# Patient Record
Sex: FEMALE | Race: BLACK OR AFRICAN AMERICAN
[De-identification: names, ages, dates, MRNs, and addresses within clinical notes are randomized per-mention and may not be internally consistent; named-entity substitution may affect disease eponyms.]

---

## 2017-07-31 ENCOUNTER — HOSPITAL ENCOUNTER (EMERGENCY)
Dept: HOSPITAL 72 - EMR | Age: 42
Discharge: HOME | End: 2017-07-31
Payer: SELF-PAY

## 2017-07-31 VITALS — SYSTOLIC BLOOD PRESSURE: 175 MMHG | DIASTOLIC BLOOD PRESSURE: 90 MMHG

## 2017-07-31 VITALS — SYSTOLIC BLOOD PRESSURE: 178 MMHG | DIASTOLIC BLOOD PRESSURE: 84 MMHG

## 2017-07-31 VITALS — BODY MASS INDEX: 47.09 KG/M2 | HEIGHT: 66 IN | WEIGHT: 293 LBS

## 2017-07-31 VITALS — DIASTOLIC BLOOD PRESSURE: 102 MMHG | SYSTOLIC BLOOD PRESSURE: 158 MMHG

## 2017-07-31 VITALS — DIASTOLIC BLOOD PRESSURE: 94 MMHG | SYSTOLIC BLOOD PRESSURE: 179 MMHG

## 2017-07-31 VITALS — SYSTOLIC BLOOD PRESSURE: 151 MMHG | DIASTOLIC BLOOD PRESSURE: 92 MMHG

## 2017-07-31 VITALS — SYSTOLIC BLOOD PRESSURE: 158 MMHG | DIASTOLIC BLOOD PRESSURE: 102 MMHG

## 2017-07-31 DIAGNOSIS — R51: Primary | ICD-10-CM

## 2017-07-31 DIAGNOSIS — Z91.041: ICD-10-CM

## 2017-07-31 DIAGNOSIS — I89.0: ICD-10-CM

## 2017-07-31 DIAGNOSIS — Z88.6: ICD-10-CM

## 2017-07-31 DIAGNOSIS — I10: ICD-10-CM

## 2017-07-31 LAB
ALBUMIN/GLOB SERPL: 0.8 {RATIO} (ref 1–2.7)
ALT SERPL-CCNC: 13 U/L (ref 3–33)
ANION GAP SERPL CALC-SCNC: 9 MMOL/L (ref 5–15)
APPEARANCE UR: (no result)
APTT BLD: 32 SEC (ref 23–33)
AST SERPL-CCNC: 14 U/L (ref 5–40)
BACTERIA #/AREA URNS HPF: (no result) /HPF
BASOPHILS NFR BLD AUTO: 0.7 % (ref 0–2)
CALCIUM SERPL-MCNC: 8.6 MG/DL (ref 8.6–10.2)
CHLORIDE SERPL-SCNC: 99 MEQ/L (ref 98–107)
CK MB SERPL-MCNC: 2.5 NG/ML (ref ?–3.8)
CO2 SERPL-SCNC: 31 MEQ/L (ref 20–30)
CREAT SERPL-MCNC: 0.9 MG/DL (ref 0.5–0.9)
EOSINOPHIL NFR BLD AUTO: 3.1 % (ref 0–3)
ERYTHROCYTE [DISTWIDTH] IN BLOOD BY AUTOMATED COUNT: 16.2 % (ref 11.6–14.8)
GFR SERPLBLD BASED ON 1.73 SQ M-ARVRAT: > 60 ML/MIN (ref 60–?)
GLOBULIN SER-MCNC: 4 G/DL
HEMOLYSIS: 1
INR PPP: 1 (ref 0.9–1.1)
KETONES UR QL STRIP: NEGATIVE
LEUKOCYTE ESTERASE UR QL STRIP: (no result)
LYMPHOCYTES NFR BLD AUTO: 37.9 % (ref 20–45)
MCH RBC QN AUTO: 24.6 PG (ref 27–31)
MCHC RBC AUTO-ENTMCNC: 30.3 G/DL (ref 32–36)
MCV RBC AUTO: 81 FL (ref 80–99)
MONOCYTES NFR BLD AUTO: 5.1 % (ref 1–10)
NEUTROPHILS NFR BLD AUTO: 53.2 % (ref 45–75)
NITRITE UR QL STRIP: NEGATIVE
PH UR STRIP: 6 [PH] (ref 4.5–8)
PLATELET # BLD: 266 K/UL (ref 150–450)
PMV BLD AUTO: 6.8 FL (ref 6.5–10.1)
POTASSIUM SERPL-SCNC: 4.2 MEQ/L (ref 3.4–4.9)
PROT SERPL-MCNC: 7.4 G/DL (ref 6.6–8.7)
PROT UR QL STRIP: NEGATIVE
PROTHROMBIN TIME: 10.9 SEC (ref 9.3–11.5)
RBC # BLD AUTO: 4.82 M/UL (ref 4.2–5.4)
RBC #/AREA URNS HPF: (no result) /HPF (ref 0–2)
SODIUM SERPL-SCNC: 139 MEQ/L (ref 135–145)
SP GR UR STRIP: 1.02 (ref 1–1.03)
SQUAMOUS #/AREA URNS LPF: (no result) /LPF
TRICHOMONAS #/AREA URNS HPF: (no result) /HPF
TROPONIN I SERPL-MCNC: < 0.3 NG/ML (ref ?–0.3)
UROBILINOGEN UR-MCNC: NORMAL MG/DL (ref 0–1)
WBC # BLD AUTO: 8.7 K/UL (ref 4.8–10.8)
WBC #/AREA URNS HPF: (no result) /HPF (ref 0–2)

## 2017-07-31 PROCEDURE — 84484 ASSAY OF TROPONIN QUANT: CPT

## 2017-07-31 PROCEDURE — 85610 PROTHROMBIN TIME: CPT

## 2017-07-31 PROCEDURE — 80053 COMPREHEN METABOLIC PANEL: CPT

## 2017-07-31 PROCEDURE — 80300: CPT

## 2017-07-31 PROCEDURE — 99284 EMERGENCY DEPT VISIT MOD MDM: CPT

## 2017-07-31 PROCEDURE — 93005 ELECTROCARDIOGRAM TRACING: CPT

## 2017-07-31 PROCEDURE — 81003 URINALYSIS AUTO W/O SCOPE: CPT

## 2017-07-31 PROCEDURE — 85025 COMPLETE CBC W/AUTO DIFF WBC: CPT

## 2017-07-31 PROCEDURE — 83880 ASSAY OF NATRIURETIC PEPTIDE: CPT

## 2017-07-31 PROCEDURE — 85730 THROMBOPLASTIN TIME PARTIAL: CPT

## 2017-07-31 PROCEDURE — 82550 ASSAY OF CK (CPK): CPT

## 2017-07-31 PROCEDURE — 96374 THER/PROPH/DIAG INJ IV PUSH: CPT

## 2017-07-31 PROCEDURE — 82553 CREATINE MB FRACTION: CPT

## 2017-07-31 PROCEDURE — 36415 COLL VENOUS BLD VENIPUNCTURE: CPT

## 2017-07-31 PROCEDURE — 71010: CPT

## 2017-07-31 NOTE — DIAGNOSTIC IMAGING REPORT
Indication: SOB



Technique: One view of the chest



Comparison: 5/17/2013



Findings: Lungs and pleural spaces are clear. Heart size is upper limits normal.

Better inspiration on the current study



Impression: No acute process

## 2017-07-31 NOTE — EMERGENCY ROOM REPORT
History of Present Illness


General


Chief Complaint:  Headache


Source:  Patient





Present Illness


HPI


This patient states she has had an ongoing headache for the past day.  She also 

states that she has had lower extremity edema in both of her lower extremities.

  She states this has been ongoing and waxes and wanes in severity.  She at one 

point was on a blood pressure medication and this patient was taken off of this 

medication because her blood pressure was normal.  Otherwise, she has no 

medical problems and is on no medications.  She denies blurry vision or double 

vision.  She denies chest pain.  She states she does occasionally have 

shortness of breath.  She denies fever or chills.  She denies vomiting.  She 

has had some nausea.  She has no other complaints.


Allergies:  


Coded Allergies:  


     IBUPROFEN (Verified  Allergy, Severe, facial edema, 5/16/13)


Uncoded Allergies:  


     contrast dye (Allergy, Severe, syncope, 5/16/13)





Patient History


Past Medical History:  see triage record, HTN


Social History:  Denies: alcohol use, drug use, smoking


Last Menstrual Period:  a week ago


Reviewed Nursing Documentation:  PMH: Agreed, PSxH: Agreed





Nursing Documentation-PMH


Past Medical History:  No History, Except For


Hx Hypertension:  Yes





Review of Systems


All Other Systems:  negative except mentioned in HPI





Physical Exam





Vital Signs








  Date Time  Temp Pulse Resp B/P Pulse Ox O2 Delivery O2 Flow Rate FiO2


 


7/31/17 11:00 98.1 73 16 164/98 94 Room Air  








Sp02 EP Interpretation:  reviewed, normal


General Appearance:  no apparent distress, alert, GCS 15, non-toxic


Head:  normocephalic, atraumatic


Eyes:  bilateral eye PERRL, bilateral eye normal inspection


ENT:  hearing grossly normal, normal pharynx, no angioedema, normal voice


Neck:  full range of motion, supple/symm/no masses


Respiratory:  chest non-tender, lungs clear, normal breath sounds, speaking 

full sentences


Cardiovascular #1:  regular rate, rhythm, no edema


Gastrointestinal:  normal bowel sounds, non tender, soft, non-distended, no 

guarding, no rebound


Rectal:  deferred


Musculoskeletal:  back normal, gait/station normal, normal range of motion, non-

tender, swelling - 2+pitting BLE edema


Neurologic:  alert, oriented x3, responsive, motor strength/tone normal, 

sensory intact, speech normal


Psychiatric:  judgement/insight normal, memory normal, mood/affect normal, no 

suicidal/homicidal ideation


Skin:  normal color, no rash, warm/dry, well hydrated





Medical Decision Making


Diagnostic Impression:  


 Primary Impression:  


 Headache


 Additional Impressions:  


 Hypertension


 Lymphedema


ER Course


This patient presented with hypertension.  The patient is very difficult to 

obtain of a pressure on because of her morbid obesity.  There was no extra 

large blood pressure cuff available.  Therefore, blood pressure was taken on 

the patient's forearm.  Pulses not accurate method of measuring blood pressure.

  Regardless, patient also complained of ongoing headache.  This by history 

sounded to be like a tension headache.  There are no history or physical exam 

findings that would make me concerned for an intracranial process.  Therefore, 

I did not obtain a CT of the head.  Patient also has lymphedema the bilateral 

lower extremities.  This appears to be peripheral edema.  There is no evidence 

of congestive heart failure with a normal chest x-ray and BNP.  The patient's 

laboratory workup to include CBC, CMP and cardiac enzymes are unremarkable.  

Overall, this patient a very benign evaluation.  I have low suspicion for acute 

coronary syndrome or PE at this time.  I will start the patient on amlodipine 

and hydrochlorothiazide for blood pressure.  I will also give the patient Lasix 

for breakthrough lymphedema.  Patient was educated on electrolyte depletion and 

increasing her potassium intake.  Patient indicated understanding.  Patient was 

also instructed to followup closely with her primary care physician.  The 

patient's given return precautions and followup instructions.





Labs








Test


  7/31/17


11:05 7/31/17


11:30


 


White Blood Count


  8.7 K/UL


(4.8-10.8) 


 


 


Red Blood Count


  4.82 M/UL


(4.20-5.40) 


 


 


Hemoglobin


  11.8 G/DL


(12.0-16.0) 


 


 


Hematocrit


  39.0 %


(37.0-47.0) 


 


 


Mean Corpuscular Volume 81 FL (80-99)  


 


Mean Corpuscular Hemoglobin


  24.6 PG


(27.0-31.0) 


 


 


Mean Corpuscular Hemoglobin


Concent 30.3 G/DL


(32.0-36.0) 


 


 


Red Cell Distribution Width


  16.2 %


(11.6-14.8) 


 


 


Platelet Count


  266 K/UL


(150-450) 


 


 


Mean Platelet Volume


  6.8 FL


(6.5-10.1) 


 


 


Neutrophils (%) (Auto)


  53.2 %


(45.0-75.0) 


 


 


Lymphocytes (%) (Auto)


  37.9 %


(20.0-45.0) 


 


 


Monocytes (%) (Auto)


  5.1 %


(1.0-10.0) 


 


 


Eosinophils (%) (Auto)


  3.1 %


(0.0-3.0) 


 


 


Basophils (%) (Auto)


  0.7 %


(0.0-2.0) 


 


 


Prothrombin Time


  


  10.9 SEC


(9.30-11.50)


 


Prothromb Time International


Ratio 


  1.0 (0.9-1.1) 


 


 


Activated Partial


Thromboplast Time 


  32 SEC (23-33) 


 


 


Urine Color  Yellow 


 


Urine Appearance


  


  Slightly


cloudy


 


Urine pH  6 (4.5-8.0) 


 


Urine Specific Gravity


  


  1.020


(1.005-1.035)


 


Urine Protein


  


  Negative


(NEGATIVE)


 


Urine Glucose (UA)


  


  Negative


(NEGATIVE)


 


Urine Ketones


  


  Negative


(NEGATIVE)


 


Urine Occult Blood


  


  Negative


(NEGATIVE)


 


Urine Nitrite


  


  Negative


(NEGATIVE)


 


Urine Bilirubin


  


  Negative


(NEGATIVE)


 


Urine Urobilinogen


  


  Normal MG/DL


(0.0-1.0)


 


Urine Leukocyte Esterase  3+ (NEGATIVE) 


 


Urine RBC


  


  0-2 /HPF (0 -


2)


 


Urine WBC


  


  5-10 /HPF (0 -


2)


 


Urine Squamous Epithelial


Cells 


  Moderate /LPF


(NONE/OCC)


 


Urine Bacteria


  


  Few /HPF


(NONE)


 


Urine Trichomonas


  


  Few /HPF


(NONE)


 


Sodium Level


  


  139 mEQ/L


(135-145)


 


Potassium Level


  


  4.2 mEQ/L


(3.4-4.9)


 


Chloride Level


  


  99 mEQ/L


()


 


Carbon Dioxide Level


  


  31 mEQ/L


(20-30)


 


Anion Gap  9 (5-15) 


 


Blood Urea Nitrogen  9 mg/dL (7-23) 


 


Creatinine


  


  0.9 mg/dL


(0.5-0.9)


 


Estimat Glomerular Filtration


Rate 


  > 60 mL/min


(>60)


 


Glucose Level


  


  107 mg/dL


()


 


Calcium Level


  


  8.6 mg/dL


(8.6-10.2)


 


Total Bilirubin


  


  0.7 mg/dL


(0.0-1.2)


 


Aspartate Amino Transf


(AST/SGOT) 


  14 U/L (5-40) 


 


 


Alanine Aminotransferase


(ALT/SGPT) 


  13 U/L (3-33) 


 


 


Alkaline Phosphatase


  


  99 U/L


()


 


Total Creatine Kinase


  


  195 U/L


()


 


Creatine Kinase MB


  


  2.5 ng/mL (<


3.8)


 


Creatine Kinase MB Relative


Index 


  1.2 


 


 


Troponin I


  


  < 0.30 ng/mL


(<=0.30)


 


Pro-B-Type Natriuretic Peptide


  


  72 pg/mL


(0-125)


 


Total Protein


  


  7.4 g/dL


(6.6-8.7)


 


Albumin


  


  3.4 g/dL


(3.5-5.2)


 


Globulin  4.0 g/dL 


 


Albumin/Globulin Ratio  0.8 (1.0-2.7) 


 


Urine Opiates Screen


  


  Negative


(NEGATIVE)


 


Urine Barbiturates Screen


  


  Negative


(NEGATIVE)


 


Phencyclidine (PCP) Screen


  


  Negative


(NEGATIVE)


 


Urine Amphetamines Screen


  


  Negative


(NEGATIVE)


 


Urine Benzodiazepines Screen


  


  Negative


(NEGATIVE)


 


Urine Cocaine Screen


  


  Negative


(NEGATIVE)


 


Urine Marijuana (THC) Screen


  


  Negative


(NEGATIVE)








EKG Diagnostic Results


Rate:  normal


Rhythm:  NSR


ST Segments:  no acute changes





Rhythm Strip Diag. Results


EP Interpretation:  yes


Rate:  70's


Rhythm:  NSR, no PVC's, no ectopy





Chest X-Ray Diagnostic Results


Chest X-Ray Diagnostic Results :  


   Chest X-Ray Ordered:  Yes


   # of Views/Limited/Complete:  1 View


   Indication:  Shortness of Breath


   EP Interpretation:  No


   Interpretation:  no consolidation, no effusion, no pneumothorax, no acute 

cardiopulmonary disease


   Impression:  No acute disease





Last Vital Signs








  Date Time  Temp Pulse Resp B/P Pulse Ox O2 Delivery O2 Flow Rate FiO2


 


7/31/17 13:00  75 18 179/94 97 Room Air  


 


7/31/17 11:00 98.1       








Status:  improved


Disposition:  HOME, SELF-CARE


Condition:  Improved


Referrals:  


NON PHYSICIAN (PCP)


Patient Instructions:  Tension Headache











CHRISTINA SHERMAN D.O. Jul 31, 2017 13:30

## 2017-08-02 NOTE — CARDIOLOGY REPORT
--------------- APPROVED REPORT --------------





EKG Measurement

Heart [id number]

NM [id number]

[id number]

[id number]

[id number]





Normal sinus rhythm

Normal ECG

## 2018-06-22 ENCOUNTER — HOSPITAL ENCOUNTER (EMERGENCY)
Dept: HOSPITAL 72 - EMR | Age: 43
Discharge: HOME | End: 2018-06-22
Payer: MEDICAID

## 2018-06-22 VITALS — BODY MASS INDEX: 45.99 KG/M2 | WEIGHT: 293 LBS | HEIGHT: 67 IN

## 2018-06-22 VITALS — DIASTOLIC BLOOD PRESSURE: 94 MMHG | SYSTOLIC BLOOD PRESSURE: 136 MMHG

## 2018-06-22 VITALS — DIASTOLIC BLOOD PRESSURE: 82 MMHG | SYSTOLIC BLOOD PRESSURE: 136 MMHG

## 2018-06-22 DIAGNOSIS — R42: Primary | ICD-10-CM

## 2018-06-22 DIAGNOSIS — Z88.6: ICD-10-CM

## 2018-06-22 DIAGNOSIS — R06.02: ICD-10-CM

## 2018-06-22 DIAGNOSIS — I10: ICD-10-CM

## 2018-06-22 LAB
ADD MANUAL DIFF: NO
ALBUMIN SERPL-MCNC: 3 G/DL (ref 3.4–5)
ALBUMIN/GLOB SERPL: 0.6 {RATIO} (ref 1–2.7)
ALP SERPL-CCNC: 103 U/L (ref 46–116)
ALT SERPL-CCNC: 22 U/L (ref 12–78)
ANION GAP SERPL CALC-SCNC: 4 MMOL/L (ref 5–15)
AST SERPL-CCNC: 16 U/L (ref 15–37)
BASOPHILS NFR BLD AUTO: 0.6 % (ref 0–2)
BILIRUB SERPL-MCNC: 0.7 MG/DL (ref 0.2–1)
BUN SERPL-MCNC: 12 MG/DL (ref 7–18)
CALCIUM SERPL-MCNC: 8.7 MG/DL (ref 8.5–10.1)
CHLORIDE SERPL-SCNC: 101 MMOL/L (ref 98–107)
CO2 SERPL-SCNC: 31 MMOL/L (ref 21–32)
CREAT SERPL-MCNC: 1.1 MG/DL (ref 0.55–1.3)
EOSINOPHIL NFR BLD AUTO: 2.4 % (ref 0–3)
ERYTHROCYTE [DISTWIDTH] IN BLOOD BY AUTOMATED COUNT: 15.6 % (ref 11.6–14.8)
GLOBULIN SER-MCNC: 5 G/DL
HCT VFR BLD CALC: 39.1 % (ref 37–47)
HGB BLD-MCNC: 12.2 G/DL (ref 12–16)
LYMPHOCYTES NFR BLD AUTO: 34.7 % (ref 20–45)
MCV RBC AUTO: 81 FL (ref 80–99)
MONOCYTES NFR BLD AUTO: 4.7 % (ref 1–10)
NEUTROPHILS NFR BLD AUTO: 57.6 % (ref 45–75)
PLATELET # BLD: 282 K/UL (ref 150–450)
POTASSIUM SERPL-SCNC: 3.9 MMOL/L (ref 3.5–5.1)
RBC # BLD AUTO: 4.86 M/UL (ref 4.2–5.4)
SODIUM SERPL-SCNC: 136 MMOL/L (ref 136–145)
WBC # BLD AUTO: 10.9 K/UL (ref 4.8–10.8)

## 2018-06-22 PROCEDURE — 71045 X-RAY EXAM CHEST 1 VIEW: CPT

## 2018-06-22 PROCEDURE — 83880 ASSAY OF NATRIURETIC PEPTIDE: CPT

## 2018-06-22 PROCEDURE — 85379 FIBRIN DEGRADATION QUANT: CPT

## 2018-06-22 PROCEDURE — 36415 COLL VENOUS BLD VENIPUNCTURE: CPT

## 2018-06-22 PROCEDURE — 84484 ASSAY OF TROPONIN QUANT: CPT

## 2018-06-22 PROCEDURE — 99283 EMERGENCY DEPT VISIT LOW MDM: CPT

## 2018-06-22 PROCEDURE — 93005 ELECTROCARDIOGRAM TRACING: CPT

## 2018-06-22 PROCEDURE — 85025 COMPLETE CBC W/AUTO DIFF WBC: CPT

## 2018-06-22 PROCEDURE — 80053 COMPREHEN METABOLIC PANEL: CPT

## 2018-06-22 NOTE — DIAGNOSTIC IMAGING REPORT
Indication: Shortness of breath

 

Technique: One view of the chest

 

Comparison: 7/31/2017

 

Findings: Lungs and pleural spaces are clear. Heart size is upper limits normal.  No

significant change

 

Impression: No acute process

## 2018-06-22 NOTE — CARDIOLOGY REPORT
--------------- APPROVED REPORT --------------





EKG Measurement

Heart [id number]

AK [id number]

[id number]

[id number]

[id number]





Normal sinus rhythm

Possible Left atrial enlargement

Borderline ECG

## 2018-06-22 NOTE — EMERGENCY ROOM REPORT
History of Present Illness


General


Chief Complaint:  Dizziness


Source:  Patient





Present Illness


HPI


41yo F p/w 1 day history of intermittent SOB as well as dizziness and LH, 

recently stopped taking BP meds, denies numbness, tingling, weakness, slurred 

speech, chest pain, syncope.


Allergies:  


Coded Allergies:  


     IBUPROFEN (Verified  Allergy, Severe, facial edema, 6/22/18)


Uncoded Allergies:  


     contrast dye (Allergy, Severe, syncope, 5/16/13)





Patient History


Past Medical History:  see triage record


Reviewed Nursing Documentation:  PMH: Agreed; PSxH: Agreed





Nursing Documentation-PMH


Hx Hypertension:  Yes





Review of Systems


All Other Systems:  negative except mentioned in HPI





Physical Exam


Sp02 EP Interpretation:  reviewed, normal


General Appearance:  no apparent distress, alert, non-toxic


Head:  normocephalic


Eyes:  bilateral eye normal inspection, bilateral eye PERRL, bilateral eye EOMI


ENT:  normal ENT inspection, hearing grossly normal, normal pharynx, no 

angioedema, normal voice, moist mucus membranes


Neck:  normal inspection, full range of motion, supple, supple/symm/no masses


Respiratory:  chest non-tender, lungs clear, normal breath sounds, chest 

symmetrical, palpation of chest normal


Cardiovascular #1:  normal peripheral pulses, regular rate, rhythm


Cardiovascular #2:  2+ radial (R), 2+ radial (L), 2+ dorsalis pedis (R), 2+ 

dorsalis pedis (L)


Gastrointestinal:  normal inspection, non tender, soft, no mass, no guarding, 

no rebound


Rectal:  deferred


Genitourinary:  normal inspection, no CVA tenderness


Musculoskeletal:  back normal, gait/station normal, normal range of motion, non-

tender, no calf tenderness, Daniela's Sign negative


Neurologic:  normal inspection, alert, oriented x3, responsive, CNs III-XII nml 

as tested, motor strength/tone normal, sensory intact, cerebellar normal, 

normal gait, speech normal


Psychiatric:  judgement/insight normal, memory normal, mood/affect normal, no 

suicidal/homicidal ideation


Skin:  normal color, no rash, warm/dry, normal turgor


Lymphatic:  no adenopathy





Medical Decision Making


Diagnostic Impression:  


 Primary Impression:  


 Dizziness of unknown cause


 Additional Impression:  


 Shortness of breath


ER Course


Patient with shortness of breath and lightheadedness, no vertigo, normal neuro 

exam, normal exam otherwise as well other than marked obesity


EKG, labs including d-dimer and proBNP also normal


Chest x-ray with no acute abnormalities


Patient with no chest pain no active shortness of breath, and normal blood 

pressure other than slightly high diastolic,


Patient will ambulate without difficulty


She was given aspirin, but I do not suspect atypical acute coronary syndrome


She'll be discharge, diagnosis shortness of breath with no obvious etiology, 

and lightheadedness without focal findings


I do suspect there is some component of anxiety as well as sleep apnea, and 

recommend she follow up with her primary care doctor


EKG Diagnostic Results


EKG Time:  01:54


EP Interpretation:  no st-t changes, no twi's


Rate:  normal


Rhythm:  NSR


ST Segments:  no acute changes


ASA given to the pt in ED:  Yes





Rhythm Strip Diag. Results


Rhythm Strip Time:  02:04


EP Interpretation:  yes


Rate:  89


Rhythm:  NSR, no PVC's, no ectopy





Chest X-Ray Diagnostic Results


Chest X-Ray Diagnostic Results :  


   Chest X-Ray Ordered:  Yes


   # of Views/Limited/Complete:  1 View


   Indication:  Shortness of Breath


   EP Interpretation:  Yes


   Interpretation:  no consolidation, no effusion, no pneumothorax, no acute 

cardiopulmonary disease


   Impression:  No acute disease


   Electronically Signed by:  Namita Blanco MD


Disposition:  HOME, SELF-CARE


Condition:  Stable











NAMITA BLANCO M.D Jun 22, 2018 02:10

## 2018-11-26 ENCOUNTER — HOSPITAL ENCOUNTER (EMERGENCY)
Dept: HOSPITAL 72 - EMR | Age: 43
Discharge: HOME | End: 2018-11-26
Payer: SELF-PAY

## 2018-11-26 VITALS — HEIGHT: 66 IN | BODY MASS INDEX: 47.09 KG/M2 | WEIGHT: 293 LBS

## 2018-11-26 VITALS — DIASTOLIC BLOOD PRESSURE: 91 MMHG | SYSTOLIC BLOOD PRESSURE: 155 MMHG

## 2018-11-26 VITALS — DIASTOLIC BLOOD PRESSURE: 92 MMHG | SYSTOLIC BLOOD PRESSURE: 151 MMHG

## 2018-11-26 DIAGNOSIS — J18.8: Primary | ICD-10-CM

## 2018-11-26 DIAGNOSIS — R06.02: ICD-10-CM

## 2018-11-26 DIAGNOSIS — I10: ICD-10-CM

## 2018-11-26 LAB
ADD MANUAL DIFF: NO
ALBUMIN SERPL-MCNC: 3 G/DL (ref 3.4–5)
ALBUMIN/GLOB SERPL: 0.5 {RATIO} (ref 1–2.7)
ALP SERPL-CCNC: 121 U/L (ref 46–116)
ALT SERPL-CCNC: 20 U/L (ref 12–78)
ANION GAP SERPL CALC-SCNC: 7 MMOL/L (ref 5–15)
AST SERPL-CCNC: 33 U/L (ref 15–37)
BASOPHILS NFR BLD AUTO: 0.6 % (ref 0–2)
BILIRUB SERPL-MCNC: 0.7 MG/DL (ref 0.2–1)
BUN SERPL-MCNC: 16 MG/DL (ref 7–18)
CALCIUM SERPL-MCNC: 8.8 MG/DL (ref 8.5–10.1)
CHLORIDE SERPL-SCNC: 101 MMOL/L (ref 98–107)
CO2 SERPL-SCNC: 30 MMOL/L (ref 21–32)
CREAT SERPL-MCNC: 0.9 MG/DL (ref 0.55–1.3)
EOSINOPHIL NFR BLD AUTO: 3.9 % (ref 0–3)
ERYTHROCYTE [DISTWIDTH] IN BLOOD BY AUTOMATED COUNT: 15 % (ref 11.6–14.8)
GLOBULIN SER-MCNC: 6.3 G/DL
HCT VFR BLD CALC: 42.4 % (ref 37–47)
HGB BLD-MCNC: 12.8 G/DL (ref 12–16)
LYMPHOCYTES NFR BLD AUTO: 37.4 % (ref 20–45)
MCV RBC AUTO: 79 FL (ref 80–99)
MONOCYTES NFR BLD AUTO: 4.8 % (ref 1–10)
NEUTROPHILS NFR BLD AUTO: 53.3 % (ref 45–75)
PLATELET # BLD: 296 K/UL (ref 150–450)
POTASSIUM SERPL-SCNC: 4.9 MMOL/L (ref 3.5–5.1)
RBC # BLD AUTO: 5.39 M/UL (ref 4.2–5.4)
SODIUM SERPL-SCNC: 138 MMOL/L (ref 136–145)
WBC # BLD AUTO: 13.7 K/UL (ref 4.8–10.8)

## 2018-11-26 PROCEDURE — 36415 COLL VENOUS BLD VENIPUNCTURE: CPT

## 2018-11-26 PROCEDURE — 94664 DEMO&/EVAL PT USE INHALER: CPT

## 2018-11-26 PROCEDURE — 84484 ASSAY OF TROPONIN QUANT: CPT

## 2018-11-26 PROCEDURE — 85025 COMPLETE CBC W/AUTO DIFF WBC: CPT

## 2018-11-26 PROCEDURE — 80053 COMPREHEN METABOLIC PANEL: CPT

## 2018-11-26 PROCEDURE — 99284 EMERGENCY DEPT VISIT MOD MDM: CPT

## 2018-11-26 PROCEDURE — 71045 X-RAY EXAM CHEST 1 VIEW: CPT

## 2018-11-26 PROCEDURE — 93005 ELECTROCARDIOGRAM TRACING: CPT

## 2018-11-26 PROCEDURE — 94640 AIRWAY INHALATION TREATMENT: CPT

## 2018-11-26 NOTE — EMERGENCY ROOM REPORT
History of Present Illness


General


Chief Complaint:  Upper Respiratory Illness


Source:  Patient





Present Illness


HPI


42-year-old female presents ED for evaluation.  States she's been complaining 

of shortness of breath 1 day.  Notes cough which is productive.  Denies chest 

pain.  Denies fevers or chills.  Denies sick contacts or recent travel.  No 

other aggravating relieving factors.  Denies any other associated symptoms


Allergies:  


Coded Allergies:  


     IBUPROFEN (Verified  Allergy, Severe, facial edema, 6/22/18)


Uncoded Allergies:  


     contrast dye (Allergy, Severe, syncope, 5/16/13)





Patient History


Past Medical History:  HTN


Past Surgical History:  none


Pertinent Family History:  none


Social History:  Denies: smoking, alcohol use, drug use


Last Menstrual Period:  July, 2018 irregular last four years.


Pregnant Now:  No


Immunizations:  UTD


Reviewed Nursing Documentation:  PMH: Agreed; PSxH: Agreed





Nursing Documentation-PMH


Hx Hypertension:  Yes





Review of Systems


All Other Systems:  negative except mentioned in HPI





Physical Exam





Vital Signs








  Date Time  Temp Pulse Resp B/P (MAP) Pulse Ox O2 Delivery O2 Flow Rate FiO2


 


11/26/18 18:12 98.2 80 20 185/101 96 Room Air  


 


11/26/18 18:28        100








Sp02 EP Interpretation:  reviewed, normal


General Appearance:  no apparent distress, alert, GCS 15, non-toxic, obese


Head:  normocephalic, atraumatic


Eyes:  bilateral eye normal inspection, bilateral eye PERRL


ENT:  hearing grossly normal, normal pharynx, no angioedema, normal voice


Neck:  full range of motion, supple/symm/no masses


Respiratory:  chest non-tender, normal breath sounds, decreased breath sounds, 

speaking full sentences


Cardiovascular #1:  regular rate, rhythm, no edema


Cardiovascular #2:  2+ carotid (R), 2+ carotid (L), 2+ radial (R), 2+ radial (L)

, 2+ dorsalis pedis (R), 2+ dorsalis pedis (L)


Gastrointestinal:  normal bowel sounds, non tender, soft, non-distended, no 

guarding, no rebound


Rectal:  deferred


Genitourinary:  normal inspection, no CVA tenderness


Musculoskeletal:  back normal, gait/station normal, normal range of motion, non-

tender


Neurologic:  alert, oriented x3, responsive, motor strength/tone normal, 

sensory intact, speech normal


Psychiatric:  judgement/insight normal, memory normal, mood/affect normal, no 

suicidal/homicidal ideation


Reflexes:  3+ bicep (R), 3+ bicep (L), 3+ tricep (R), 3+ tricep (L), 3+ knee (R)

, 3+ knee (L)


Skin:  normal color, no rash, warm/dry, well hydrated


Lymphatic:  no adenopathy





Medical Decision Making


Diagnostic Impression:  


 Primary Impression:  


 Atypical pneumonia


ER Course


Hospital Course 


43 yo F presents to ED c/o cough, SOB 





Differential diagnoses include: URI, bronchitis, asthma/COPD, pneumonia 





Clinical course


Patient placed on stretcher.  After initial history, physical exam reveals an 

obese female in no acute distress.  Bilateral TM unremarkable.  No pharyngeal 

erythema.  No tonsillar exudates.  No lymphadenopathy.  reduced breath sounds 

bilaterally





I ordered labs, IV fluids, nebs, chest x-ray.





Labs reviewed-leukocytosis noted, hemoglobin/hematocrit stable, electrolytes 

okay


EKG - NSR, no acute ischemic changes interpereted by me 


Chest x-ray shows possible infiltrate





On reassessment patient states she feels better after breathing treatments.  

Per curb-65 criteria, patient does not require admission.  Patient can be 

safely discharged to home with outpatient therapy.  Patient agrees with plan.  





Diagnosis - atypical pneumonia 





Stable and discharged home with prescriptions for albuterol, prednisone, 

promethazine/codeine, amoxicillin.  Instructed to followup with PMD.  Return to 

ED if symptoms recur or worsen





Labs








Test


  11/26/18


18:45 11/26/18


18:54


 


White Blood Count


  13.7 K/UL


(4.8-10.8) 


 


 


Red Blood Count


  5.39 M/UL


(4.20-5.40) 


 


 


Hemoglobin


  12.8 G/DL


(12.0-16.0) 


 


 


Hematocrit


  42.4 %


(37.0-47.0) 


 


 


Mean Corpuscular Volume 79 FL (80-99)  


 


Mean Corpuscular Hemoglobin


  23.8 PG


(27.0-31.0) 


 


 


Mean Corpuscular Hemoglobin


Concent 30.2 G/DL


(32.0-36.0) 


 


 


Red Cell Distribution Width


  15.0 %


(11.6-14.8) 


 


 


Platelet Count


  296 K/UL


(150-450) 


 


 


Mean Platelet Volume


  6.6 FL


(6.5-10.1) 


 


 


Neutrophils (%) (Auto)


  53.3 %


(45.0-75.0) 


 


 


Lymphocytes (%) (Auto)


  37.4 %


(20.0-45.0) 


 


 


Monocytes (%) (Auto)


  4.8 %


(1.0-10.0) 


 


 


Eosinophils (%) (Auto)


  3.9 %


(0.0-3.0) 


 


 


Basophils (%) (Auto)


  0.6 %


(0.0-2.0) 


 


 


Sodium Level


  


  138 MMOL/L


(136-145)


 


Potassium Level


  


  4.9 MMOL/L


(3.5-5.1)


 


Chloride Level


  


  101 MMOL/L


()


 


Carbon Dioxide Level


  


  30 MMOL/L


(21-32)


 


Anion Gap


  


  7 mmol/L


(5-15)


 


Blood Urea Nitrogen


  


  16 mg/dL


(7-18)


 


Creatinine


  


  0.9 MG/DL


(0.55-1.30)


 


Estimat Glomerular Filtration


Rate 


  > 60 mL/min


(>60)


 


Glucose Level


  


  102 MG/DL


()


 


Calcium Level


  


  8.8 MG/DL


(8.5-10.1)


 


Total Bilirubin


  


  0.7 MG/DL


(0.2-1.0)


 


Aspartate Amino Transf


(AST/SGOT) 


  33 U/L (15-37) 


 


 


Alanine Aminotransferase


(ALT/SGPT) 


  20 U/L (12-78) 


 


 


Alkaline Phosphatase


  


  121 U/L


()


 


Troponin I


  


  0.000 ng/mL


(0.000-0.056)


 


Total Protein


  


  9.3 G/DL


(6.4-8.2)


 


Albumin


  


  3.0 G/DL


(3.4-5.0)


 


Globulin  6.3 g/dL 


 


Albumin/Globulin Ratio  0.5 (1.0-2.7) 








EKG Diagnostic Results


Rate:  normal


Rhythm:  NSR


ST Segments:  no acute changes


ASA given to the pt in ED:  No





Rhythm Strip Diag. Results


EP Interpretation:  yes


Rhythm:  NSR, no PVC's, no ectopy





Chest X-Ray Diagnostic Results


Chest X-Ray Diagnostic Results :  


   Chest X-Ray Ordered:  Yes


   # of Views/Limited/Complete:  1 View


   Indication:  Shortness of Breath


   EP Interpretation:  Yes


   Interpretation:  no pneumothorax, other - haziness LLL


   Impression:  Other - ??PNA


   Electronically Signed by:  Electronically signed by Adrien Andersen MD





Last Vital Signs








  Date Time  Temp Pulse Resp B/P (MAP) Pulse Ox O2 Delivery O2 Flow Rate FiO2


 


11/26/18 20:54 97.4 85 20 151/92 100 Room Air  21








Status:  improved


Disposition:  HOME, SELF-CARE


Condition:  Stable


Scripts


Prednisone* (PREDNISONE*) 20 Mg Tablet


40 MG ORAL DAILY, #10 TAB


   Prov: Adrien Andersen MD         11/26/18 


Albuterol Sulfate* (ALBUTEROL SULFATE MDI*) 8.5 Gm Hfa.aer.ad


2 PUFF INH Q6H, #1 EA 0 Refills


   Prov: Adrien Andersen MD         11/26/18 


Codeine/Promethazine Hcl* (PROMETHAZINE-CODEINE SYRUP*) 118 Ml Syrup


5 ML ORAL Q6H PRN for For Cough, #118 ML 0 Refills


   Prov: Adrien Andersen MD         11/26/18 


Amoxicillin* (AMOXIL*) 500 Mg Capsule


500 MG ORAL THREE TIMES A DAY, #21 CAP


   Prov: Adrien Andersen MD         11/26/18


Patient Instructions:  Community-Acquired Pneumonia, Adult, Easy-to-Read











Adrien Andersen MD Nov 26, 2018 23:06

## 2018-11-27 NOTE — DIAGNOSTIC IMAGING REPORT
Indication: Shortness of breath

 

Technique: One view of the chest

 

Comparison: 6/22/2018

 

Findings: Body habitus limits evaluation. The heart is enlarged. There is equivocal

minimal interstitial congestion and central bronchial wall thickening. No effusions.

No focal airspace consolidation. 

 

Impression: Cardiomegaly

 

Equivocal minimal interstitial congestion and central bronchial wall thickening.

Correlate with clinical finding

 

Negative for infiltrate

## 2018-11-28 NOTE — CARDIOLOGY REPORT
--------------- APPROVED REPORT --------------





EKG Measurement

Heart [id number]

PA [id number]

[id number]

[id number]

[id number]





Normal sinus rhythm

Normal ECG